# Patient Record
Sex: FEMALE | Race: BLACK OR AFRICAN AMERICAN | NOT HISPANIC OR LATINO | URBAN - METROPOLITAN AREA
[De-identification: names, ages, dates, MRNs, and addresses within clinical notes are randomized per-mention and may not be internally consistent; named-entity substitution may affect disease eponyms.]

---

## 2017-08-24 ENCOUNTER — TELEPHONE (OUTPATIENT)
Dept: NEUROSURGERY | Facility: CLINIC | Age: 57
End: 2017-08-24

## 2017-08-24 NOTE — TELEPHONE ENCOUNTER
----- Message from Mihai Frankel sent at 8/24/2017  2:37 PM CDT -----  Contact: Patient's sister  Patient's sister called wanting to get Dr. Chao's office to fax paper work back to transportation stating the reason for her appointment so that patient can have a ride to her appointment for tomorrow. Please contact patient at [phone]. Thank You.

## 2017-08-24 NOTE — TELEPHONE ENCOUNTER
----- Message from Boy Douglas sent at 8/24/2017 10:06 AM CDT -----  Contact: Letha ( sister ) [phone]  Caller is requesting a return call regarding the letter that was sent via fax about pt's transportation to the appt tomorrow, pls call ASAP

## 2017-09-07 ENCOUNTER — TELEPHONE (OUTPATIENT)
Dept: NEUROSURGERY | Facility: CLINIC | Age: 57
End: 2017-09-07

## 2017-09-07 NOTE — TELEPHONE ENCOUNTER
Spoke with patient's mom. Jeannette advised that patient is in the hospital and can not make visit.